# Patient Record
Sex: FEMALE | URBAN - METROPOLITAN AREA
[De-identification: names, ages, dates, MRNs, and addresses within clinical notes are randomized per-mention and may not be internally consistent; named-entity substitution may affect disease eponyms.]

---
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
## 2025-06-27 ENCOUNTER — LAB OUTSIDE AN ENCOUNTER (OUTPATIENT)
Dept: URBAN - METROPOLITAN AREA CLINIC 115 | Facility: CLINIC | Age: 20
End: 2025-06-27

## 2025-06-27 ENCOUNTER — OFFICE VISIT (OUTPATIENT)
Dept: URBAN - METROPOLITAN AREA CLINIC 115 | Facility: CLINIC | Age: 20
End: 2025-06-27

## 2025-06-27 NOTE — HPI-TODAY'S VISIT:
Joe Garcia is a 20-year-old female who presented for follow-up of elevated liver enzymes and persistent gastrointestinal symptoms. 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 Despite surgery, she continues to experience persistent nausea, intermittent right upper quadrant pain, and constipation that recurs every few months, sometimes alternating with loose stools after fatty meals. She denied any clear link between her abdominal pain and bowel changes and has made dietary adjustments to help with her symptoms. Joe also reported ongoing fatigue and lethargy, which have not improved with vitamin D supplementation, adequate sleep, or caffeine. She feels this fatigue is abnormal for her age, despite generally healthy sleep habits. Additionally, she described frequent upper respiratory infections, which have become more persistent since her last year of high school. She often becomes ill before exams, with sinus symptoms and cough, and has required multiple courses of antibiotics, though she tries to avoid them due to liver concerns. Joe remains proactive in her care, monitoring her vitamin levels and seeking solutions for her ongoing symptoms.

## 2025-07-11 ENCOUNTER — TELEPHONE ENCOUNTER (OUTPATIENT)
Dept: URBAN - METROPOLITAN AREA CLINIC 115 | Facility: CLINIC | Age: 20
End: 2025-07-11

## 2025-07-11 ENCOUNTER — OFFICE VISIT (OUTPATIENT)
Dept: URBAN - METROPOLITAN AREA CLINIC 115 | Facility: CLINIC | Age: 20
End: 2025-07-11

## 2025-07-29 ENCOUNTER — TELEPHONE ENCOUNTER (OUTPATIENT)
Dept: URBAN - METROPOLITAN AREA CLINIC 115 | Facility: CLINIC | Age: 20
End: 2025-07-29

## 2025-08-05 ENCOUNTER — TELEPHONE ENCOUNTER (OUTPATIENT)
Dept: URBAN - METROPOLITAN AREA CLINIC 115 | Facility: CLINIC | Age: 20
End: 2025-08-05